# Patient Record
Sex: FEMALE | Race: WHITE | Employment: OTHER | URBAN - METROPOLITAN AREA
[De-identification: names, ages, dates, MRNs, and addresses within clinical notes are randomized per-mention and may not be internally consistent; named-entity substitution may affect disease eponyms.]

---

## 2017-03-13 ENCOUNTER — RADIANT APPOINTMENT (OUTPATIENT)
Dept: MAMMOGRAPHY | Facility: CLINIC | Age: 77
End: 2017-03-13
Payer: COMMERCIAL

## 2017-03-13 ENCOUNTER — OFFICE VISIT (OUTPATIENT)
Dept: OBGYN | Facility: CLINIC | Age: 77
End: 2017-03-13
Payer: COMMERCIAL

## 2017-03-13 VITALS
HEART RATE: 72 BPM | SYSTOLIC BLOOD PRESSURE: 124 MMHG | HEIGHT: 64 IN | BODY MASS INDEX: 33.29 KG/M2 | WEIGHT: 195 LBS | DIASTOLIC BLOOD PRESSURE: 80 MMHG

## 2017-03-13 DIAGNOSIS — Z12.31 VISIT FOR SCREENING MAMMOGRAM: ICD-10-CM

## 2017-03-13 DIAGNOSIS — Z12.73 SCREENING FOR OVARIAN CANCER: Primary | ICD-10-CM

## 2017-03-13 LAB — CANCER AG125 SERPL-ACNC: 18 U/ML (ref 0–30)

## 2017-03-13 PROCEDURE — G0145 SCR C/V CYTO,THINLAYER,RESCR: HCPCS | Performed by: OBSTETRICS & GYNECOLOGY

## 2017-03-13 PROCEDURE — 86304 IMMUNOASSAY TUMOR CA 125: CPT | Performed by: OBSTETRICS & GYNECOLOGY

## 2017-03-13 PROCEDURE — 36415 COLL VENOUS BLD VENIPUNCTURE: CPT | Performed by: OBSTETRICS & GYNECOLOGY

## 2017-03-13 PROCEDURE — G0202 SCR MAMMO BI INCL CAD: HCPCS | Mod: TC

## 2017-03-13 PROCEDURE — 99213 OFFICE O/P EST LOW 20 MIN: CPT | Performed by: OBSTETRICS & GYNECOLOGY

## 2017-03-13 RX ORDER — LATANOPROST 50 UG/ML
1 SOLUTION/ DROPS OPHTHALMIC
COMMUNITY
Start: 2016-11-14

## 2017-03-13 RX ORDER — DORZOLAMIDE HYDROCHLORIDE AND TIMOLOL MALEATE 20; 5 MG/ML; MG/ML
1 SOLUTION/ DROPS OPHTHALMIC
COMMUNITY
Start: 2016-07-19

## 2017-03-13 ASSESSMENT — PATIENT HEALTH QUESTIONNAIRE - PHQ9: 5. POOR APPETITE OR OVEREATING: NOT AT ALL

## 2017-03-13 ASSESSMENT — ANXIETY QUESTIONNAIRES
6. BECOMING EASILY ANNOYED OR IRRITABLE: NOT AT ALL
5. BEING SO RESTLESS THAT IT IS HARD TO SIT STILL: NOT AT ALL
7. FEELING AFRAID AS IF SOMETHING AWFUL MIGHT HAPPEN: NOT AT ALL
2. NOT BEING ABLE TO STOP OR CONTROL WORRYING: NOT AT ALL
1. FEELING NERVOUS, ANXIOUS, OR ON EDGE: NOT AT ALL

## 2017-03-13 NOTE — PROGRESS NOTES
Elo is a 77 year old No obstetric history on file. female who presents for annual exam.     Besides routine health maintenance, she has no other health concerns today .    Do you have a Health Care Directive?: Yes: Patient states has Advance Directive and will bring in a copy to clinic.    Fall risk:   Fallen 2 or more times in the past year?: No  Any fall with injury in the past year?: No    HPI: The patient is seen in follow-up of a 20 year history of ovarian cancer. She was aggressively debulked and treated with chemotherapy initially and has shown no evidence of recurrence in the past. Her review of systems is positive for legs aching and increased vascular compromise in both lower extremities. She denies any vaginal bleeding or pain. She does have a rectocele that is symptomatic if she gets constipated.  The patient's PCP is Mera Courtney MD.      GYNECOLOGIC HISTORY:  No LMP recorded. Patient is postmenopausal..   reports that she has never smoked. She has never used smokeless tobacco.    Patient is sexually active.  STD testing offered?  Declined  Last PHQ-9 score on record= No flowsheet data found.  Last GAD7 score on record= No flowsheet data found.  Alcohol Score = 0    HEALTH MAINTENANCE:  Cholesterol: (No results found for: CHOL   Last Mammo: 2/8/16, Result: normal, Next Mammo: today   Pap: 2/8/16 neg  DEXA:  2/8/16  Colonoscopy:  Per patient 2 years ago, Result:  normal, Next Colonoscopy: 3 years.    Health maintenance updated:  yes    HISTORY:  Obstetric History     No data available        There is no problem list on file for this patient.    Past Surgical History   Procedure Laterality Date     Hernia repair       Ablate vein varicose radio frequency without phlebectomy multiple stab  12/5/2013     Procedure: ABLATE VEIN VARICOSE RADIO FREQUENCY WITHOUT PHLEBECTOMY MULTIPLE STAB;  Right Leg Radiofrequency Ablation;  Surgeon: Chuckie Neville MD;  Location: [facility]     Hysterectomy total  abdominal, bilateral salpingo-oophorectomy, combined       omenectomy      Social History   Substance Use Topics     Smoking status: Never Smoker     Smokeless tobacco: Never Used     Alcohol use No           Current Outpatient Prescriptions   Medication Sig     clobetasol (TEMOVATE) 0.05 % ointment      dorzolamide (TRUSOPT) 2 % ophthalmic solution      TRAVATAN Z 0.004 % ophthalmic solution      BIOTIN PO Take by mouth daily     VITAMIN D, CHOLECALCIFEROL, PO Take 2,000 Units by mouth daily     levothyroxine (SYNTHROID) 100 MCG tablet Take  by mouth daily.     lisinopril (PRINIVIL,ZESTRIL) 10 MG tablet Take 10 mg by mouth daily.     triamcinolone (KENALOG) 0.1 % cream Apply  topically 2 times daily.     No current facility-administered medications for this visit.        Allergies   Allergen Reactions     Alphagan [Brimonidine Tartrate]      Augmentin      Hmg-Coa-R Inhibitors Other (See Comments)     Terbinafine Other (See Comments)     Loss of taste      Timolol Unknown     Amoxicillin Rash     Hydrochlorothiazide Rash     No Clinical Screening - See Comments Rash     eye swollwn around glue - eye drop for glaucoma       Past medical, surgical, social and family history were reviewed and updated in EPIC.    ROS:   12 point review of systems negative other than symptoms noted below.  Head: Nasal Congestion and Sore Throat  Genitourinary: Urgency and Vaginal Itching  Musculoskeletal: Muscle Cramps    EXAM:  There were no vitals taken for this visit.   BMI: There is no height or weight on file to calculate BMI.    EXAM:  Constitutional: Appearance: Well nourished, well developed alert, in no acute distress  Neck:  Lymph Nodes:  No lymphadenopathy present    Thyroid:  Gland size normal, nontender, no nodules or masses present  on palpation  Chest:  Respiratory Effort:  Breathing unlabored  Cardiovascular:Heart    Auscultation:  Regular rate, normal rhythm, no murmurs present  Breasts: Inspection of Breasts:  No  lymphadenopathy present    Palpation of Breasts and Axillae:  No masses present on palpation, no  breast tenderness    Axillary Lymph Nodes:  No lymphadenopathy present  Gastrointestinal:  Abdominal Examination:  Abdomen nontender to palpation, tone normal without     rigidity or guarding, no masses present, umbilicus without lesions    Liver and speen:  No hepatomegaly present, liver nontender to palpation    Hernias:  No hernias present  Lymphatic: Lymph Nodes:  No other lymphadenopathy present  Skin:  General Inspection:  No rashes present, no lesions present, no areas of  discoloration.    Genitalia and Groin:  No rashes present, no lesions present, no areas of  discoloration, no masses present  Neurologic/Psychiatric:    Mental Status:  Oriented X3     Pelvic Exam:  External Genitalia:     Normal appearance for age, no discharge present, no tenderness present, no inflammatory lesions present, color normal  Vagina:     Normal vaginal vault without central or paravaginal defects, no discharge present, no inflammatory lesions present, no masses present  Bladder:     Nontender to palpation  Urethra:   Urethral Body:  Urethra palpation normal, urethra structural support normal   Urethral Meatus:  No erythema or lesions present  Cervix:     Surgically absent  Uterus:     Surgically absent  Adnexa:     Surgically absent  Perineum:     Perineum within normal limits, no evidence of trauma, no rashes or skin lesions present  Anus:     Anus within normal limits, no hemorrhoids present  Inguinal Lymph Nodes:     No lymphadenopathy present    COUNSELING:   Reviewed preventive health counseling, as reflected in patient instructions       Regular exercise       Healthy diet/nutrition    BMI:  There is no height or weight on file to calculate BMI.  Weight management plan noted, stable and monitoring   reports that she has never smoked. She has never used smokeless tobacco.      ASSESSMENT:  77 year old female with satisfactory  follow-up exam for a 20 year history of ovarian cancer. She does have some increased compromise of her lower extremities by vascular insufficiency.      PLAN: We will draw a CA-125 today and refer her to vein solutions for evaluation of her lower extremities.      Glenn Rodriguez MD

## 2017-03-13 NOTE — LETTER
[facility]  [address] [place] [address] , [address]  [place]   [phone]      3/15/2017     Elo Issa   [address] [facility] [address]      Dear Elo,  We are happy to inform you that your PAP smear result is normal.  We are now able to do a follow up test on PAP smears. The DNA test is for HPV (Human Papilloma Virus). Cervical cancer is closely linked with certain types of HPV. Your result showed no evidence of HPV.  Therefore we recommend you return in 1 year for your next pap smear.  You will still need to return to the clinic every year for an annual exam and other preventive tests.  Please contact the clinic with any questions.  Sincerely,    Glenn Leger MD

## 2017-03-13 NOTE — MR AVS SNAPSHOT
"              After Visit Summary   3/13/2017    Elo Issa    MRN: [id number]           Patient Information     Date Of Birth          1940        Visit Information        Provider Department      3/13/2017 11:45 AM Glenn Rodriguez MD [facility]        Today's Diagnoses     Screening for ovarian cancer    -  1       Follow-ups after your visit        Who to contact     If you have questions or need follow up information about today's clinic visit or your schedule please contact [facility] directly at [phone].  Normal or non-critical lab and imaging results will be communicated to you by Tradiiohart, letter or phone within 4 business days after the clinic has received the results. If you do not hear from us within 7 days, please contact the clinic through Tradiiohart or phone. If you have a critical or abnormal lab result, we will notify you by phone as soon as possible.  Submit refill requests through Uromedica or call your pharmacy and they will forward the refill request to us. Please allow 3 business days for your refill to be completed.          Additional Information About Your Visit        MyChart Information     Uromedica lets you send messages to your doctor, view your test results, renew your prescriptions, schedule appointments and more. To sign up, go to [email or web address] . Click on \"Log in\" on the left side of the screen, which will take you to the Welcome page. Then click on \"Sign up Now\" on the right side of the page.     You will be asked to enter the access code listed below, as well as some personal information. Please follow the directions to create your username and password.     Your access code is: [email or web address]  Expires: 2017 12:09 PM     Your access code will  in 90 days. If you need help or a new code, please call your [place] clinic or [phone].        Care EveryWhere ID     This is your Care EveryWhere ID. This could be " "used by other organizations to access your [facility] medical records  [id number]        Your Vitals Were     Pulse Height BMI (Body Mass Index)             72 1.613 m (5' 3.5\") 34 kg/m2          Blood Pressure from Last 3 Encounters:   03/13/17 124/80   02/08/16 126/80   12/13/13 138/69    Weight from Last 3 Encounters:   03/13/17 88.5 kg (195 lb)   02/08/16 84.8 kg (187 lb)   12/13/13 90.7 kg (200 lb)              We Performed the Following          Pap imaged thin layer screen reflex to HPV if ASCUS - recommended age 25 - 29 years          Today's Medication Changes          These changes are accurate as of: 3/13/17 12:09 PM.  If you have any questions, ask your nurse or doctor.               Stop taking these medicines if you haven't already. Please contact your care team if you have questions.     clobetasol 0.05 % ointment   Commonly known as:  TEMOVATE   Stopped by:  Glenn Rodriguez MD           dorzolamide 2 % ophthalmic solution   Commonly known as:  TRUSOPT   Stopped by:  lGenn Rodriguez MD           TRAVATAN Z 0.004 % ophthalmic solution   Generic drug:  travoprost (SHAQUILLE Free)   Stopped by:  Glenn Rodriguez MD                    Primary Care Provider Office Phone # Fax #    Kenyatta Courtney [phone]       [place] [address]        Thank you!     Thank you for choosing [facility] [place]  for your care. Our goal is always to provide you with excellent care. Hearing back from our patients is one way we can continue to improve our services. Please take a few minutes to complete the written survey that you may receive in the mail after your visit with us. Thank you!             Your Updated Medication List - Protect others around you: Learn how to safely use, store and throw away your medicines at www.disposemymeds.org.          This list is accurate as of: 3/13/17 12:09 PM.  Always use your most recent med list.          "          Brand Name Dispense Instructions for use    BIOTIN PO      Take by mouth daily       dorzolamide-timolol 2-0.5 % ophthalmic solution    COSOPT     1 drop       latanoprost 0.005 % ophthalmic solution    XALATAN     1 drop       lisinopril 10 MG tablet    PRINIVIL/ZESTRIL     Take 10 mg by mouth daily.       SYNTHROID 100 MCG tablet   Generic drug:  levothyroxine      Take  by mouth daily.       triamcinolone 0.1 % cream    KENALOG     Apply  topically 2 times daily.       VITAMIN D (CHOLECALCIFEROL) PO      Take 2,000 Units by mouth daily

## 2017-03-14 ASSESSMENT — PATIENT HEALTH QUESTIONNAIRE - PHQ9: SUM OF ALL RESPONSES TO PHQ QUESTIONS 1-9: 0

## 2017-03-15 ENCOUNTER — TELEPHONE (OUTPATIENT)
Dept: OBGYN | Facility: CLINIC | Age: 77
End: 2017-03-15

## 2017-03-15 LAB
COPATH REPORT: NORMAL
PAP: NORMAL

## 2018-03-27 ENCOUNTER — RADIANT APPOINTMENT (OUTPATIENT)
Dept: MAMMOGRAPHY | Facility: CLINIC | Age: 78
End: 2018-03-27
Attending: OBSTETRICS & GYNECOLOGY
Payer: COMMERCIAL

## 2018-03-27 ENCOUNTER — OFFICE VISIT (OUTPATIENT)
Dept: OBGYN | Facility: CLINIC | Age: 78
End: 2018-03-27
Attending: OBSTETRICS & GYNECOLOGY
Payer: COMMERCIAL

## 2018-03-27 VITALS
SYSTOLIC BLOOD PRESSURE: 126 MMHG | DIASTOLIC BLOOD PRESSURE: 70 MMHG | HEIGHT: 63 IN | BODY MASS INDEX: 34.02 KG/M2 | HEART RATE: 72 BPM | WEIGHT: 192 LBS

## 2018-03-27 DIAGNOSIS — Z12.31 VISIT FOR SCREENING MAMMOGRAM: ICD-10-CM

## 2018-03-27 DIAGNOSIS — Z12.4 SCREENING FOR CERVICAL CANCER: Primary | ICD-10-CM

## 2018-03-27 DIAGNOSIS — Z85.43 PERSONAL HISTORY OF OVARIAN CANCER: ICD-10-CM

## 2018-03-27 LAB — CANCER AG125 SERPL-ACNC: 15 U/ML (ref 0–30)

## 2018-03-27 PROCEDURE — G0145 SCR C/V CYTO,THINLAYER,RESCR: HCPCS | Performed by: OBSTETRICS & GYNECOLOGY

## 2018-03-27 PROCEDURE — G0476 HPV COMBO ASSAY CA SCREEN: HCPCS | Performed by: OBSTETRICS & GYNECOLOGY

## 2018-03-27 PROCEDURE — 77067 SCR MAMMO BI INCL CAD: CPT | Mod: TC

## 2018-03-27 PROCEDURE — 86304 IMMUNOASSAY TUMOR CA 125: CPT | Performed by: OBSTETRICS & GYNECOLOGY

## 2018-03-27 PROCEDURE — 99213 OFFICE O/P EST LOW 20 MIN: CPT | Performed by: OBSTETRICS & GYNECOLOGY

## 2018-03-27 PROCEDURE — 36415 COLL VENOUS BLD VENIPUNCTURE: CPT | Performed by: OBSTETRICS & GYNECOLOGY

## 2018-03-27 RX ORDER — BRIMONIDINE TARTRATE 1.5 MG/ML
SOLUTION/ DROPS OPHTHALMIC
COMMUNITY
Start: 2018-01-18

## 2018-03-27 ASSESSMENT — ANXIETY QUESTIONNAIRES
IF YOU CHECKED OFF ANY PROBLEMS ON THIS QUESTIONNAIRE, HOW DIFFICULT HAVE THESE PROBLEMS MADE IT FOR YOU TO DO YOUR WORK, TAKE CARE OF THINGS AT HOME, OR GET ALONG WITH OTHER PEOPLE: NOT DIFFICULT AT ALL
6. BECOMING EASILY ANNOYED OR IRRITABLE: NOT AT ALL
2. NOT BEING ABLE TO STOP OR CONTROL WORRYING: NOT AT ALL
7. FEELING AFRAID AS IF SOMETHING AWFUL MIGHT HAPPEN: NOT AT ALL
5. BEING SO RESTLESS THAT IT IS HARD TO SIT STILL: NOT AT ALL
1. FEELING NERVOUS, ANXIOUS, OR ON EDGE: NOT AT ALL
GAD7 TOTAL SCORE: 0
3. WORRYING TOO MUCH ABOUT DIFFERENT THINGS: NOT AT ALL

## 2018-03-27 ASSESSMENT — PATIENT HEALTH QUESTIONNAIRE - PHQ9: 5. POOR APPETITE OR OVEREATING: NOT AT ALL

## 2018-03-27 NOTE — PROGRESS NOTES
Elo is a 78 year old No obstetric history on file. female who presents for ongoing evaluation and follow-up of a long-standing diagnosis of ovarian cancer.    Besides routine health maintenance, she has no other health concerns today .    Do you have a Health Care Directive?: Yes: Patient states has Advance Directive and will bring in a copy to clinic.    Fall risk:   Fall Risk Assessment completed per order.    HPI: The patient is seen at this time for follow-up of stage III ovarian cancer diagnosed and treated in 1997.  She has had a recent colonoscopy with 2 biopsies were negative.  She has no GI complaints at this time.  The patient's PCP is Kenyatta Courtney MD, MD.      GYNECOLOGIC HISTORY:  No LMP recorded. Patient is postmenopausal..   reports that she has never smoked. She has never used smokeless tobacco.    Patient is sexually active.  STD testing offered?  Declined  Last PHQ-9 score on record=   PHQ-9 SCORE 3/27/2018   Total Score 0     Last GAD7 score on record=   ROBERT-7 SCORE 3/27/2018   Total Score 0     Alcohol Score = 0    HEALTH MAINTENANCE:  Cholesterol: None found.  Last Mammo: one year ago, Result: normal, Next Mammo: today   Pap:   Lab Results   Component Value Date    PAP NIL 03/13/2017    PAP NIL 02/08/2016      DEXA:  02/08/16  Colonoscopy:  03/12/18, Result:  [facility], Next Colonoscopy: 3 years.    Health maintenance updated:  yes    HISTORY:  Obstetric History     No data available        There is no problem list on file for this patient.    Past Surgical History:   Procedure Laterality Date     ABLATE VEIN VARICOSE RADIO FREQUENCY WITHOUT PHLEBECTOMY MULTIPLE STAB  12/5/2013    Procedure: ABLATE VEIN VARICOSE RADIO FREQUENCY WITHOUT PHLEBECTOMY MULTIPLE STAB;  Right Leg Radiofrequency Ablation;  Surgeon: Chuckie Neville MD;  Location: [facility] OR     HERNIA REPAIR       HYSTERECTOMY TOTAL ABDOMINAL, BILATERAL SALPINGO-OOPHORECTOMY, COMBINED      omenectomy     "  Social History   Substance Use Topics     Smoking status: Never Smoker     Smokeless tobacco: Never Used     Alcohol use No           Current Outpatient Prescriptions   Medication Sig     brimonidine (ALPHAGAN-P) 0.15 % ophthalmic solution PLACE 1 DROP INTO RIGHT EYE 3 TIMES DAILY.     latanoprost (XALATAN) 0.005 % ophthalmic solution 1 drop     dorzolamide-timolol (COSOPT) 2-0.5 % ophthalmic solution 1 drop     BIOTIN PO Take by mouth daily     VITAMIN D, CHOLECALCIFEROL, PO Take 2,000 Units by mouth daily     levothyroxine (SYNTHROID) 100 MCG tablet Take  by mouth daily.     lisinopril (PRINIVIL,ZESTRIL) 10 MG tablet Take 10 mg by mouth daily.     triamcinolone (KENALOG) 0.1 % cream Apply  topically 2 times daily.     No current facility-administered medications for this visit.        Allergies   Allergen Reactions     Alphagan [Brimonidine Tartrate]      Augmentin      Cefuroxime Hives     Hmg-Coa-R Inhibitors Other (See Comments)     Terbinafine Other (See Comments)     Loss of taste      Timolol Unknown     Amoxicillin Rash     Brimonidine      Other reaction(s): Intolerance-Can't Take     Hydrochlorothiazide Rash     No Clinical Screening - See Comments Rash     eye swollwn around glue - eye drop for glaucoma     Triple Antibiotic Rash       Past medical, surgical, social and family history were reviewed and updated in EPIC.    ROS:   12 point review of systems negative other than symptoms noted below.  Gastrointestinal: Diarrhea  Skin: Rash    EXAM:  /70  Pulse 72  Ht 5' 3.25\" (1.607 m)  Wt 192 lb (87.1 kg)  BMI 33.74 kg/m2   BMI: Body mass index is 33.74 kg/(m^2).    EXAM:  Constitutional: Appearance: Well nourished, well developed alert, in no acute distress  Neck:  Lymph Nodes:  No lymphadenopathy present    Thyroid:  Gland size normal, nontender, no nodules or masses present  on palpation  Chest:  Respiratory Effort:  Breathing unlabored  Cardiovascular:Heart    Auscultation:  Regular rate, " normal rhythm, no murmurs present  Breasts: Inspection of Breasts:  No lymphadenopathy present., Palpation of Breasts and Axillae:  No masses present on palpation, no breast tenderness., Axillary Lymph Nodes:  No lymphadenopathy present. and No nodularity, asymmetry or nipple discharge bilaterally.  Gastrointestinal:  Abdominal Examination:  Abdomen nontender to palpation, tone normal without     rigidity or guarding, no masses present, umbilicus without lesions    Liver and speen:  No hepatomegaly present, liver nontender to palpation    Hernias:  No hernias present  Lymphatic: Lymph Nodes:  No other lymphadenopathy present  Skin:  General Inspection:  No rashes present, no lesions present, no areas of  discoloration.    Genitalia and Groin:  No rashes present, no lesions present, no areas of  discoloration, no masses present  Neurologic/Psychiatric:    Mental Status:  Oriented X3     Pelvic Exam:  External Genitalia:     Normal appearance for age, no discharge present, no tenderness present, no inflammatory lesions present, color normal  Vagina:     Normal vaginal vault without central or paravaginal defects, no discharge present, no inflammatory lesions present, no masses present  Bladder:     Nontender to palpation  Urethra:   Urethral Body:  Urethra palpation normal, urethra structural support normal   Urethral Meatus:  No erythema or lesions present  Cervix:     Surgically absent  Uterus:     Surgically absent  Adnexa:     Surgically absent  Perineum:     Perineum within normal limits, no evidence of trauma, no rashes or skin lesions present  Anus:     Anus within normal limits, no hemorrhoids present  Inguinal Lymph Nodes:     No lymphadenopathy present    COUNSELING:   Reviewed preventive health counseling, as reflected in patient instructions    BMI:  Body mass index is 33.74 kg/(m^2).  Weight management plan noted, stable and monitoring   reports that she has never smoked. She has never used smokeless  tobacco.      ASSESSMENT:  78 year old female with satisfactory ovarian cancer follow-up check.    PLAN: We will convey the patient's , Pap smear, and mammogram results when available.      Glenn Rodriguez MD

## 2018-03-27 NOTE — MR AVS SNAPSHOT
"              After Visit Summary   3/27/2018    Elo Issa    MRN: [id number]           Patient Information     Date Of Birth          1940        Visit Information        Provider Department      3/27/2018 1:00 PM Glenn Rodriguez MD [facility] [place]        Today's Diagnoses     Screening for cervical cancer    -  1    Personal history of ovarian cancer           Follow-ups after your visit        Your next 10 appointments already scheduled     Mar 27, 2018  1:30 PM CDT   (Arrive by 1:15 PM)   MA SCREENING DIGITAL BILATERAL with WEMA1   [facility] [place] ([facility] [place])    [address], [address]  [place] [address] [phone]           Do not use any powder, lotion or deodorant under your arms or on your breast. If you do, we will ask you to remove it before your exam.  Wear comfortable, two-piece clothing.  If you have any allergies, tell your care team.  Bring any previous mammograms from other facilities or have them mailed to the breast center. Three-dimensional (3D) mammograms are available at [place] locations in [facility], [facility], [place], [place], and [place]. [facility] locations include [place] [facility] in [place]. Benefits of 3D mammograms include: - Improved rate of cancer detection - Decreases your chance of having to go back for more tests, which means fewer: - \"False-positive\" results (This means that there is an abnormal area but it isn't cancer.) - Invasive testing procedures, such as a biopsy or surgery - Can provide clearer images of the breast if you have dense breast tissue. 3D mammography is an optional exam that anyone can have with a 2D mammogram. It doesn't replace or take the place of a 2D mammogram. 2D mammograms remain an effective screening test for all women.  Not all insurance companies cover the cost of a 3D mammogram. Check with your " "insurance.              Who to contact     If you have questions or need follow up information about today's clinic visit or your schedule please contact [facility] directly at [phone].  Normal or non-critical lab and imaging results will be communicated to you by MyChart, letter or phone within 4 business days after the clinic has received the results. If you do not hear from us within 7 days, please contact the clinic through MyChart or phone. If you have a critical or abnormal lab result, we will notify you by phone as soon as possible.  Submit refill requests through BotScanner or call your pharmacy and they will forward the refill request to us. Please allow 3 business days for your refill to be completed.          Additional Information About Your Visit        BarreharBangee Information     BotScanner lets you send messages to your doctor, view your test results, renew your prescriptions, schedule appointments and more. To sign up, go to [email or web address] . Click on \"Log in\" on the left side of the screen, which will take you to the Welcome page. Then click on \"Sign up Now\" on the right side of the page.     You will be asked to enter the access code listed below, as well as some personal information. Please follow the directions to create your username and password.     Your access code is: [email or web address]  Expires: 2018  1:23 PM     Your access code will  in 90 days. If you need help or a new code, please call your [facility] clinic or [phone].        Care EveryWhere ID     This is your Care EveryWhere ID. This could be used by other organizations to access your [facility] medical records  [id number]        Your Vitals Were     Pulse Height BMI (Body Mass Index)             72 5' 3.25\" (1.607 m) 33.74 kg/m2          Blood Pressure from Last 3 Encounters:   18 126/70   17 124/80   16 126/80    Weight from Last 3 Encounters:   18 192 lb (87.1 kg)   17 " 195 lb (88.5 kg)   02/08/16 187 lb (84.8 kg)              We Performed the Following     HPV High Risk Types DNA Cervical     Pap imaged thin layer screen with HPV - recommended age 30 - 65        Primary Care Provider Office Phone # Fax #    Kenyatta Courtney [phone]       [facility] [address] [place] [address]        Equal Access to Services     MANDI GARCIA : Hadii aad ku hadasho Soomaali, waaxda luqadaha, qaybta kaalmada adeegyada, waxay idiin hayaan adeeg dandresh laAngelitajuven . So [facility] [phone].    ATENCIÓN: Si juliet harris, tiene a martinez disposición servicios gratuitos de asistencia lingüística. [facility] [phone].    We comply with applicable federal civil rights laws and [place] laws. We do not discriminate on the basis of race, color, national origin, age, disability, sex, sexual orientation, or gender identity.            Thank you!     Thank you for choosing [facility] [place]  for your care. Our goal is always to provide you with excellent care. Hearing back from our patients is one way we can continue to improve our services. Please take a few minutes to complete the written survey that you may receive in the mail after your visit with us. Thank you!             Your Updated Medication List - Protect others around you: Learn how to safely use, store and throw away your medicines at www.disposemymeds.org.          This list is accurate as of 3/27/18  1:23 PM.  Always use your most recent med list.                   Brand Name Dispense Instructions for use Diagnosis    BIOTIN PO      Take by mouth daily        brimonidine 0.15 % ophthalmic solution    ALPHAGAN-P     PLACE 1 DROP INTO RIGHT EYE 3 TIMES DAILY.        dorzolamide-timolol 2-0.5 % ophthalmic solution    COSOPT     1 drop        latanoprost 0.005 % ophthalmic solution    XALATAN     1 drop        lisinopril 10 MG tablet    PRINIVIL/ZESTRIL     Take 10 mg by mouth daily.        SYNTHROID  100 MCG tablet   Generic drug:  levothyroxine      Take  by mouth daily.        triamcinolone 0.1 % cream    KENALOG     Apply  topically 2 times daily.        VITAMIN D (CHOLECALCIFEROL) PO      Take 2,000 Units by mouth daily

## 2018-03-27 NOTE — LETTER
April 6, 2018    Elo Issa  [address] [facility] [address]    Dear Elo,  We are happy to inform you that your PAP smear result from 3/27/18 is normal.  We are now able to do a follow up test on PAP smears. The DNA test is for HPV (Human Papilloma Virus). Cervical cancer is closely linked with certain types of HPV. Your results showed no evidence of high risk HPV.  Therefore we recommend you return in 1 year for your next vaginal pap smear.  You will still need to return to the clinic every year for an annual exam and other preventive tests.  Please contact the clinic at [phone] with any questions.  Sincerely,    Glenn Rodriguez MD/anjana

## 2018-03-28 ASSESSMENT — PATIENT HEALTH QUESTIONNAIRE - PHQ9: SUM OF ALL RESPONSES TO PHQ QUESTIONS 1-9: 0

## 2018-03-28 ASSESSMENT — ANXIETY QUESTIONNAIRES: GAD7 TOTAL SCORE: 0

## 2018-03-29 LAB
COPATH REPORT: NORMAL
PAP: NORMAL

## 2018-04-02 LAB
FINAL DIAGNOSIS: NORMAL
HPV HR 12 DNA CVX QL NAA+PROBE: NEGATIVE
HPV16 DNA SPEC QL NAA+PROBE: NEGATIVE
HPV18 DNA SPEC QL NAA+PROBE: NEGATIVE
SPECIMEN DESCRIPTION: NORMAL
SPECIMEN SOURCE CVX/VAG CYTO: NORMAL

## 2018-07-05 ENCOUNTER — OFFICE VISIT (OUTPATIENT)
Dept: OBGYN | Facility: CLINIC | Age: 78
End: 2018-07-05
Payer: COMMERCIAL

## 2018-07-05 VITALS — SYSTOLIC BLOOD PRESSURE: 122 MMHG | BODY MASS INDEX: 33.74 KG/M2 | DIASTOLIC BLOOD PRESSURE: 76 MMHG | WEIGHT: 192 LBS

## 2018-07-05 DIAGNOSIS — N81.6 RECTOCELE: ICD-10-CM

## 2018-07-05 DIAGNOSIS — N95.2 VAGINAL ATROPHY: ICD-10-CM

## 2018-07-05 DIAGNOSIS — R30.0 DYSURIA: Primary | ICD-10-CM

## 2018-07-05 DIAGNOSIS — R82.90 NONSPECIFIC FINDING ON EXAMINATION OF URINE: ICD-10-CM

## 2018-07-05 LAB
BACTERIA #/AREA URNS HPF: ABNORMAL /HPF
NON-SQ EPI CELLS #/AREA URNS LPF: ABNORMAL /LPF
RBC #/AREA URNS AUTO: ABNORMAL /HPF
WBC #/AREA URNS AUTO: ABNORMAL /HPF

## 2018-07-05 PROCEDURE — 81015 MICROSCOPIC EXAM OF URINE: CPT | Performed by: OBSTETRICS & GYNECOLOGY

## 2018-07-05 PROCEDURE — 87086 URINE CULTURE/COLONY COUNT: CPT | Performed by: OBSTETRICS & GYNECOLOGY

## 2018-07-05 PROCEDURE — 87088 URINE BACTERIA CULTURE: CPT | Performed by: OBSTETRICS & GYNECOLOGY

## 2018-07-05 PROCEDURE — 99213 OFFICE O/P EST LOW 20 MIN: CPT | Performed by: OBSTETRICS & GYNECOLOGY

## 2018-07-05 RX ORDER — ESTRADIOL 0.1 MG/G
1 CREAM VAGINAL AT BEDTIME
Qty: 42.5 G | Refills: 6 | Status: SHIPPED | OUTPATIENT
Start: 2018-07-05 | End: 2019-04-03

## 2018-07-05 NOTE — PROGRESS NOTES
SUBJECTIVE:                                                   Elo Issa is a 78 year old female who presents to clinic today for the following health issue(s):  Patient presents with:  Bladder Problems      HPI: The patient is seen at this time for follow-up of a recent bladder infection.  She was treated with Macrodantin and her symptoms have cleared.  Her second issue is pressure and bulge of the posterior vaginal wall.  She has been picking up her grandchildren and has felt difficulty emptying her bowel or defecation.  She does have to splint.      No LMP recorded. Patient is postmenopausal..   Patient is not sexually active, No obstetric history on file..  Using menopause for contraception.    reports that she has never smoked. She has never used smokeless tobacco.    STD testing offered?  Declined    Health maintenance updated:  yes    Today's PHQ-2 Score:   PHQ-2 ( 1999 [employer]) 2/8/2016   Q1: Little interest or pleasure in doing things 0   Q2: Feeling down, depressed or hopeless 0   PHQ-2 Score 0     Today's PHQ-9 Score:   PHQ-9 SCORE 3/27/2018   Total Score 0     Today's ROBERT-7 Score:   ROBERT-7 SCORE 3/27/2018   Total Score 0       Problem list and histories reviewed & adjusted, as indicated.  Additional history: as documented.    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 PHLEBECTOMY MULTIPLE STAB  12/5/2013    Procedure: ABLATE VEIN VARICOSE RADIO FREQUENCY WITHOUT PHLEBECTOMY MULTIPLE STAB;  Right Leg Radiofrequency Ablation;  Surgeon: Chuckie Neville MD;  Location: [facility]     HERNIA REPAIR       HYSTERECTOMY TOTAL ABDOMINAL, BILATERAL SALPINGO-OOPHORECTOMY, COMBINED      omenectomy      Social History   Substance Use Topics     Smoking status: Never Smoker     Smokeless tobacco: Never Used     Alcohol use No           Current Outpatient  Prescriptions   Medication Sig     BIOTIN PO Take by mouth daily     brimonidine (ALPHAGAN-P) 0.15 % ophthalmic solution PLACE 1 DROP INTO RIGHT EYE 3 TIMES DAILY.     dorzolamide-timolol (COSOPT) 2-0.5 % ophthalmic solution 1 drop     latanoprost (XALATAN) 0.005 % ophthalmic solution 1 drop     levothyroxine (SYNTHROID) 100 MCG tablet Take  by mouth daily.     lisinopril (PRINIVIL,ZESTRIL) 10 MG tablet Take 10 mg by mouth daily.     triamcinolone (KENALOG) 0.1 % cream Apply  topically 2 times daily.     VITAMIN D, CHOLECALCIFEROL, PO Take 2,000 Units by mouth daily     No current facility-administered medications for this visit.      Allergies   Allergen Reactions     Alphagan [Brimonidine Tartrate]      Augmentin      Cefuroxime Hives     Hmg-Coa-R Inhibitors Other (See Comments)     Terbinafine Other (See Comments)     Loss of taste      Timolol Unknown     Amoxicillin Rash     Brimonidine      Other reaction(s): Intolerance-Can't Take     Hydrochlorothiazide Rash     No Clinical Screening - See Comments Rash     eye swollwn around glue - eye drop for glaucoma     Triple Antibiotic Rash       ROS:  12 point review of systems negative other than symptoms noted below.  Gastrointestinal: Abdominal Pain, Constipation and Diarrhea  Genitourinary: Painful Urination, Urgency and Vaginal Itching    OBJECTIVE:     /76  Wt 192 lb (87.1 kg)  Breastfeeding? No  BMI 33.74 kg/m2  Body mass index is 33.74 kg/(m^2).    Exam:  Constitutional:  Appearance: Well nourished, well developed alert, in no acute distress  Gastrointestinal:  Abdominal Examination:  Abdomen nontender to palpation, tone normal without rigidity or guarding, no masses present, umbilicus without lesions; Liver/Spleen:  No hepatomegaly present, liver nontender to palpation; Hernias:  No hernias present  Lymphatic: Lymph Nodes:  No other lymphadenopathy present  Skin:General Inspection:  No rashes present, no lesions present, no areas of discoloration;  Genitalia and Groin:  No rashes present, no lesions present, no areas of discoloration, no masses present.  Neurologic/Psychiatric:  Mental Status:  Oriented X3   Pelvic Exam:  External Genitalia:     Normal appearance for age, no discharge present, no tenderness present, no inflammatory lesions present, color normal  Vagina: Atrophic external genitalia.  There is a prominent rectocele bulging at this time.  There is actually fairly decent apical and anterior support.    Bladder:     Nontender to palpation  Urethra:   Urethral Body:  Urethra palpation normal, urethra structural support normal   Urethral Meatus:  No erythema or lesions present  Cervix:     Surgically absent  Uterus:     Surgically absent  Adnexa:     Surgically absent  Perineum:     Perineum within normal limits, no evidence of trauma, no rashes or skin lesions present  Anus:     Anus within normal limits, no hemorrhoids present  Inguinal Lymph Nodes:     No lymphadenopathy present     In-Clinic Test Results:  Results for orders placed or performed in visit on 07/05/18 (from the past 24 hour(s))   Urine Microscopic   Result Value Ref Range    WBC Urine 0 - 5 OTO5^0 - 5 /HPF    RBC Urine O - 2 OTO2^O - 2 /HPF    Squamous Epithelial /LPF Urine Few FEW^Few /LPF    Bacteria Urine Few (A) NEG^Negative /HPF       ASSESSMENT/PLAN:                                                        ICD-10-CM    1. Dysuria R30.0 Urine Microscopic     CANCELED: UA with Microscopic     We will follow-up the culture for Elo.  We discussed and showed a diagram of the issue of her rectocele.  She has so much atrophy that some estrogenization of the vagina would improve her rectocele repair that we could get to this fall.  We have asked her to avoid any further lifting and straining with bowel movements.  She should use a stool softener and avoid any foods that constipate her.  She will return in 4 weeks.        Glenn Rodriguez MD  [facility] [place]

## 2018-07-05 NOTE — MR AVS SNAPSHOT
After Visit Summary   7/5/2018    Elo Issa    MRN: [id number]           Patient Information     Date Of Birth          1940        Visit Information        Provider Department      7/5/2018 3:00 PM Glenn Rodriguez MD [facility]        Today's Diagnoses     Dysuria    -  1    Vaginal atrophy        Rectocele           Follow-ups after your visit        Who to contact     If you have questions or need follow up information about today's clinic visit or your schedule please contact [facility] directly at [phone].  Normal or non-critical lab and imaging results will be communicated to you by MyChart, letter or phone within 4 business days after the clinic has received the results. If you do not hear from us within 7 days, please contact the clinic through MyChart or phone. If you have a critical or abnormal lab result, we will notify you by phone as soon as possible.  Submit refill requests through [employer] or call your pharmacy and they will forward the refill request to us. Please allow 3 business days for your refill to be completed.          Additional Information About Your Visit        Care EveryWhere ID     This is your Care EveryWhere ID. This could be used by other organizations to access your [place] medical records  [id number]        Your Vitals Were     Breastfeeding? BMI (Body Mass Index)                No 33.74 kg/m2           Blood Pressure from Last 3 Encounters:   07/05/18 122/76   03/27/18 126/70   03/13/17 124/80    Weight from Last 3 Encounters:   07/05/18 192 lb (87.1 kg)   03/27/18 192 lb (87.1 kg)   03/13/17 195 lb (88.5 kg)              We Performed the Following     Urine Microscopic          Today's Medication Changes          These changes are accurate as of 7/5/18  3:23 PM.  If you have any questions, ask your nurse or doctor.               Start taking these medicines.        Dose/Directions    estradiol 0.1 MG/GM  cream   Commonly known as:  ESTRACE VAGINAL   Used for:  Vaginal atrophy   Started by:  Glenn Rodriguez MD        Dose:  1 g   Place 1 g vaginally At Bedtime   Quantity:  42.5 g   Refills:  6            Where to get your medicines      These medications were sent to [facility] [id number] [facility] - [address] [address]    Hours:  M-F 9-7 SAT 9-6 SUN 11-3 Phone:  [phone]     estradiol 0.1 MG/GM cream                Primary Care Provider Office Phone # Fax #    Kenyatta Courtney [phone]       [facility] [address] [facility] [address]        Equal Access to Services     MANDI GARCIA : Dharmesh Louis, waartemio murillo, qaybta kaalmada duarte, tyler bauer. So [facility] [phone].    ATENCIÓN: Si habla español, tiene a martinez disposición servicios gratuitos de asistencia lingüística. Ll[facility] [phone].    We comply with applicable federal civil rights laws and [place] laws. We do not discriminate on the basis of race, color, national origin, age, disability, sex, sexual orientation, or gender identity.            Thank you!     Thank you for choosing [facility] [place]  for your care. Our goal is always to provide you with excellent care. Hearing back from our patients is one way we can continue to improve our services. Please take a few minutes to complete the written survey that you may receive in the mail after your visit with us. Thank you!             Your Updated Medication List - Protect others around you: Learn how to safely use, store and throw away your medicines at www.disposemymeds.org.          This list is accurate as of 7/5/18  3:23 PM.  Always use your most recent med list.                   Brand Name Dispense Instructions for use Diagnosis    BIOTIN PO      Take by mouth daily        brimonidine 0.15 % ophthalmic solution    ALPHAGAN-P     PLACE  1 DROP INTO RIGHT EYE 3 TIMES DAILY.        dorzolamide-timolol 2-0.5 % ophthalmic solution    COSOPT     1 drop        estradiol 0.1 MG/GM cream    ESTRACE VAGINAL    42.5 g    Place 1 g vaginally At Bedtime    Vaginal atrophy       latanoprost 0.005 % ophthalmic solution    XALATAN     1 drop        lisinopril 10 MG tablet    PRINIVIL/ZESTRIL     Take 10 mg by mouth daily.        SYNTHROID 100 MCG tablet   Generic drug:  levothyroxine      Take  by mouth daily.        triamcinolone 0.1 % cream    KENALOG     Apply  topically 2 times daily.        VITAMIN D (CHOLECALCIFEROL) PO      Take 2,000 Units by mouth daily

## 2018-07-07 LAB
BACTERIA SPEC CULT: ABNORMAL
SPECIMEN SOURCE: ABNORMAL

## 2018-08-02 ENCOUNTER — OFFICE VISIT (OUTPATIENT)
Dept: OBGYN | Facility: CLINIC | Age: 78
End: 2018-08-02
Payer: COMMERCIAL

## 2018-08-02 VITALS
HEIGHT: 63 IN | BODY MASS INDEX: 34.2 KG/M2 | DIASTOLIC BLOOD PRESSURE: 80 MMHG | WEIGHT: 193 LBS | SYSTOLIC BLOOD PRESSURE: 122 MMHG

## 2018-08-02 DIAGNOSIS — N81.6 RECTOCELE: Primary | ICD-10-CM

## 2018-08-02 PROCEDURE — 99213 OFFICE O/P EST LOW 20 MIN: CPT | Performed by: OBSTETRICS & GYNECOLOGY

## 2018-08-02 NOTE — PROGRESS NOTES
SUBJECTIVE:                                                   Elo Issa is a 78 year old female who presents to clinic today for the following health issue(s):  Patient presents with:  Follow Up For: patient was seen recently for rectocele and bladder issues. patient states vaginal cream has been helping with the itching. she feels better with intake of stool softner and has not been lifting as much      HPI: The patient is seen at this time for follow-up of vaginal pressure from a rectocele and irritation from atrophy.  She has diligently used some estrogen cream with good relief.  She is doing her Kegel exercises.  She is a long-term survivor of ovarian cancer with no current problems.      No LMP recorded. Patient has had a hysterectomy..   Patient is sexually active, No obstetric history on file..  Using menopause and hysterectomy for contraception.    reports that she has never smoked. She has never used smokeless tobacco.    STD testing offered?  Declined    Health maintenance updated:  yes    Today's PHQ-2 Score:   PHQ-2 ( 1999 Pfizer) 2/8/2016   Q1: Little interest or pleasure in doing things 0   Q2: Feeling down, depressed or hopeless 0   PHQ-2 Score 0     Today's PHQ-9 Score:   PHQ-9 SCORE 3/27/2018   Total Score 0     Today's ROBERT-7 Score:   ROBERT-7 SCORE 3/27/2018   Total Score 0       Problem list and histories reviewed & adjusted, as indicated.  Additional history: as documented.    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 PHLEBECTOMY MULTIPLE STAB  12/5/2013    Procedure: ABLATE VEIN VARICOSE RADIO FREQUENCY WITHOUT PHLEBECTOMY MULTIPLE STAB;  Right Leg Radiofrequency Ablation;  Surgeon: Chuckie Neville MD;  Location: [facility] OR     HERNIA REPAIR       HYSTERECTOMY TOTAL ABDOMINAL, BILATERAL SALPINGO-OOPHORECTOMY, COMBINED      omenectomy     "  Social History   Substance Use Topics     Smoking status: Never Smoker     Smokeless tobacco: Never Used     Alcohol use No           Current Outpatient Prescriptions   Medication Sig     BIOTIN PO Take by mouth daily     brimonidine (ALPHAGAN-P) 0.15 % ophthalmic solution PLACE 1 DROP INTO RIGHT EYE 3 TIMES DAILY.     dorzolamide-timolol (COSOPT) 2-0.5 % ophthalmic solution 1 drop     estradiol (ESTRACE VAGINAL) 0.1 MG/GM cream Place 1 g vaginally At Bedtime     latanoprost (XALATAN) 0.005 % ophthalmic solution 1 drop     levothyroxine (SYNTHROID) 100 MCG tablet Take  by mouth daily.     lisinopril (PRINIVIL,ZESTRIL) 10 MG tablet Take 10 mg by mouth daily.     triamcinolone (KENALOG) 0.1 % cream Apply  topically 2 times daily.     VITAMIN D, CHOLECALCIFEROL, PO Take 2,000 Units by mouth daily     No current facility-administered medications for this visit.      Allergies   Allergen Reactions     Alphagan [Brimonidine Tartrate]      Augmentin      Cefuroxime Hives     Hmg-Coa-R Inhibitors Other (See Comments)     Terbinafine Other (See Comments)     Loss of taste      Timolol Unknown     Amoxicillin Rash     Brimonidine      Other reaction(s): Intolerance-Can't Take     Hydrochlorothiazide Rash     No Clinical Screening - See Comments Rash     eye swollwn around glue - eye drop for glaucoma     Triple Antibiotic Rash       ROS:  12 point review of systems negative other than symptoms noted below.    OBJECTIVE:     /80  Ht 5' 3.25\" (1.607 m)  Wt 193 lb (87.5 kg)  BMI 33.92 kg/m2  Body mass index is 33.92 kg/(m^2).    Exam:  Constitutional:  Appearance: Well nourished, well developed alert, in no acute distress  Neck:  Lymph Nodes:  No lymphadenopathy present; Thyroid:  Gland size normal, nontender, no nodules or masses present on palpation  Gastrointestinal:  Abdominal Examination:  Abdomen nontender to palpation, tone normal without rigidity or guarding, no masses present, umbilicus without lesions; " Liver/Spleen:  No hepatomegaly present, liver nontender to palpation; Hernias:  No hernias present  Lymphatic: Lymph Nodes:  No other lymphadenopathy present  Skin:General Inspection:  No rashes present, no lesions present, no areas of discoloration; Genitalia and Groin:  No rashes present, no lesions present, no areas of discoloration, no masses present.  Neurologic/Psychiatric:  Mental Status:  Oriented X3   Pelvic Exam:  External Genitalia:     Normal appearance for age, no discharge present, no tenderness present, no inflammatory lesions present, color normal  Vagina:     Normal vaginal vault without central or paravaginal defects, no discharge present, no inflammatory lesions present, no masses present moderate rectocele present  Bladder:     Nontender to palpation  Urethra:   Urethral Body:  Urethra palpation normal, urethra structural support normal   Urethral Meatus:  No erythema or lesions present  Cervix:     Surgically absent  Uterus:     Surgically absent  Adnexa:     Surgically absent  Perineum:     Perineum within normal limits, no evidence of trauma, no rashes or skin lesions present  Anus:     Anus within normal limits, no hemorrhoids present  Inguinal Lymph Nodes:     No lymphadenopathy present     In-Clinic Test Results:      ASSESSMENT/PLAN:                                                        The patient has no change in her rectocele.  It is not debilitating enough to worry about surgical correction at this time.  She has improved vaginal health.  We have asked her to use a and D ointment on her external irritation.  She will return for her ovarian cancer checkup in March      Glenn Rodriguez MD  [facility] [place]

## 2018-08-02 NOTE — MR AVS SNAPSHOT
"              After Visit Summary   8/2/2018    Elo Issa    MRN: [id number]           Patient Information     Date Of Birth          1940        Visit Information        Provider Department      8/2/2018 1:45 PM Glenn Rodriguez MD [facility]        Today's Diagnoses     Rectocele    -  1       Follow-ups after your visit        Who to contact     If you have questions or need follow up information about today's clinic visit or your schedule please contact [facility] directly at [phone].  Normal or non-critical lab and imaging results will be communicated to you by MyChart, letter or phone within 4 business days after the clinic has received the results. If you do not hear from us within 7 days, please contact the clinic through MyChart or phone. If you have a critical or abnormal lab result, we will notify you by phone as soon as possible.  Submit refill requests through [employer] or call your pharmacy and they will forward the refill request to us. Please allow 3 business days for your refill to be completed.          Additional Information About Your Visit        Care EveryWhere ID     This is your Care EveryWhere ID. This could be used by other organizations to access your [place] medical records  [id number]        Your Vitals Were     Height BMI (Body Mass Index)                5' 3.25\" (1.607 m) 33.92 kg/m2           Blood Pressure from Last 3 Encounters:   08/02/18 122/80   07/05/18 122/76   03/27/18 126/70    Weight from Last 3 Encounters:   08/02/18 193 lb (87.5 kg)   07/05/18 192 lb (87.1 kg)   03/27/18 192 lb (87.1 kg)              Today, you had the following     No orders found for display       Primary Care Provider Office Phone # Fax #    Kenyatta Courtney [phone]       [address] [address]        Equal Access to Services     MANDI GARCIA AH: farrukh Bo " maxluca villanueva waxay idiin hayaan adeeg kharash la'aan ah. So [facility] [phone].    ATENCIÓN: Si juliet harris, tiene a martinez disposición servicios gratuitos de asistencia lingüística. Dylan al [phone].    We comply with applicable federal civil rights laws and [place] laws. We do not discriminate on the basis of race, color, national origin, age, disability, sex, sexual orientation, or gender identity.            Thank you!     Thank you for choosing [facility] [place]  for your care. Our goal is always to provide you with excellent care. Hearing back from our patients is one way we can continue to improve our services. Please take a few minutes to complete the written survey that you may receive in the mail after your visit with us. Thank you!             Your Updated Medication List - Protect others around you: Learn how to safely use, store and throw away your medicines at www.disposemymeds.org.          This list is accurate as of 8/2/18 11:59 PM.  Always use your most recent med list.                   Brand Name Dispense Instructions for use Diagnosis    BIOTIN PO      Take by mouth daily        brimonidine 0.15 % ophthalmic solution    ALPHAGAN-P     PLACE 1 DROP INTO RIGHT EYE 3 TIMES DAILY.        dorzolamide-timolol 2-0.5 % ophthalmic solution    COSOPT     1 drop        estradiol 0.1 MG/GM cream    ESTRACE VAGINAL    42.5 g    Place 1 g vaginally At Bedtime    Vaginal atrophy       latanoprost 0.005 % ophthalmic solution    XALATAN     1 drop        lisinopril 10 MG tablet    PRINIVIL/ZESTRIL     Take 10 mg by mouth daily.        SYNTHROID 100 MCG tablet   Generic drug:  levothyroxine      Take  by mouth daily.        triamcinolone 0.1 % cream    KENALOG     Apply  topically 2 times daily.        VITAMIN D (CHOLECALCIFEROL) PO      Take 2,000 Units by mouth daily

## 2019-04-01 NOTE — PROGRESS NOTES
Elo is a 79 year old No obstetric history on file. female who presents for follow-up of ovarian cancer    Do you have a Health Care Directive?: Yes: Patient states has Advance Directive and will bring in a copy to clinic.    Fall risk:   Fallen 2 or more times in the past year?: No  Any fall with injury in the past year?: No    HPI : The patient is a 79-year-old who is followed at this time for ovarian cancer that was treated surgically and with chemotherapy 22 years ago.  She has no evidence of recurrence on any of her exams to date.  She only complains of ongoing issues with a rectocele.  She is able to have bowel movements as long as she does not become constipated.      GYNECOLOGIC HISTORY:  No LMP recorded. Patient has had a hysterectomy..   reports that  has never smoked. she has never used smokeless tobacco.    STD testing offered?  Declined  Last PHQ-9 score on record=   PHQ-9 SCORE 4/3/2019   PHQ-9 Total Score 0     Last GAD7 score on record=   ROBERT-7 SCORE 3/27/2018 4/3/2019   Total Score 0 0       HEALTH MAINTENANCE:  Cholesterol: 3/21/2018   Total= 225, Triglycerides=174, HDL=43, JRQ=523   Last Mammo: 3/27/2018, Result: normal, Next Mammo: today   Pap: 3/27/2018 NIL, HPV-  Lab Results   Component Value Date    PAP NIL 03/27/2018    PAP NIL 03/13/2017    PAP NIL 02/08/2016      DEXA:  2/08/2016  Colonoscopy:  3/12/2018, Result:  normal, Next Colonoscopy: PRN.    HISTORY:  Obstetric History     No data available        Past Medical History:   Diagnosis Date     Hernia, abdominal     ventral     Hyperlipidemia      Hypertension      Ovarian cancer (H)      Thyroid disease      Past Surgical History:   Procedure Laterality Date     ABLATE VEIN VARICOSE RADIO FREQUENCY WITHOUT PHLEBECTOMY MULTIPLE STAB  12/5/2013    Procedure: ABLATE VEIN VARICOSE RADIO FREQUENCY WITHOUT PHLEBECTOMY MULTIPLE STAB;  Right Leg Radiofrequency Ablation;  Surgeon: Chuckie Neville MD;  Location: [facility]     HERNIA REPAIR        HYSTERECTOMY TOTAL ABDOMINAL, BILATERAL SALPINGO-OOPHORECTOMY, COMBINED      omenectomy     History reviewed. No pertinent family history.  Social History     Socioeconomic History     Marital status:      Spouse name: Not on file     Number of children: Not on file     Years of education: Not on file     Highest education level: Not on file   Occupational History     Not on file   Social Needs     Financial resource strain: Not on file     Food insecurity:     Worry: Not on file     Inability: Not on file     Transportation needs:     Medical: Not on file     Non-medical: Not on file   Tobacco Use     Smoking status: Never Smoker     Smokeless tobacco: Never Used   Substance and Sexual Activity     Alcohol use: No     Alcohol/week: 0.0 oz     Drug use: No     Sexual activity: Yes     Partners: Male     Birth control/protection: Post-menopausal, Female Surgical     Comment: MEGAN-no cervix   Lifestyle     Physical activity:     Days per week: Not on file     Minutes per session: Not on file     Stress: Not on file   Relationships     Social connections:     Talks on phone: Not on file     Gets together: Not on file     Attends Yazdanism service: Not on file     Active member of club or organization: Not on file     Attends meetings of clubs or organizations: Not on file     Relationship status: Not on file     Intimate partner violence:     Fear of current or ex partner: Not on file     Emotionally abused: Not on file     Physically abused: Not on file     Forced sexual activity: Not on file   Other Topics Concern     Parent/sibling w/ CABG, MI or angioplasty before 65F 55M? Not Asked   Social History Narrative     Not on file     Current Outpatient Medications   Medication Sig     BIOTIN PO Take by mouth daily     brimonidine (ALPHAGAN-P) 0.15 % ophthalmic solution PLACE 1 DROP INTO RIGHT EYE 3 TIMES DAILY.     dorzolamide-timolol (COSOPT) 2-0.5 % ophthalmic solution 1 drop     estradiol (ESTRACE  "VAGINAL) 0.1 MG/GM cream Place 1 g vaginally At Bedtime     latanoprost (XALATAN) 0.005 % ophthalmic solution 1 drop     levothyroxine (SYNTHROID) 100 MCG tablet Take  by mouth daily.     lisinopril (PRINIVIL,ZESTRIL) 10 MG tablet Take 10 mg by mouth daily.     triamcinolone (KENALOG) 0.1 % cream Apply  topically 2 times daily.     VITAMIN D, CHOLECALCIFEROL, PO Take 2,000 Units by mouth daily     No current facility-administered medications for this visit.      Allergies   Allergen Reactions     Alphagan [Brimonidine Tartrate]      Augmentin      Cefuroxime Hives     Hmg-Coa-R Inhibitors Other (See Comments)     Terbinafine Other (See Comments)     Loss of taste      Timolol Unknown     Amoxicillin Rash     Brimonidine      Other reaction(s): Intolerance-Can't Take     Hydrochlorothiazide Rash     No Clinical Screening - See Comments Rash     eye swollwn around glue - eye drop for glaucoma     Triple Antibiotic Rash       Past medical, surgical, social and family history were reviewed and updated in [facility].    EXAM:  /74   Pulse 78   Ht 1.626 m (5' 4\")   Wt 91.2 kg (201 lb)   Breastfeeding? No   BMI 34.50 kg/m     BMI: Body mass index is 34.5 kg/m .    Constitutional: Appearance: Well nourished, well developed alert, in no acute distress  Breasts: Inspection of Breasts:  No lymphadenopathy present    Palpation of Breasts and Axillae:  No masses present on palpation, no  breast tenderness    Axillary Lymph Nodes:  No lymphadenopathy present  Neurologic/Psychiatric:    Mental Status:  Oriented X3     Pelvic Exam:  External Genitalia:     Normal appearance for age, no discharge present, no tenderness present, no inflammatory lesions present, color normal  Vagina:     Normal vaginal vault without central or paravaginal defects, no discharge present, no inflammatory lesions present, no masses present  Bladder:     Nontender to palpation  Urethra:   Urethral Body:  Urethra palpation normal, urethra structural " support normal   Urethral Meatus:  No erythema or lesions present  Cervix:     Surgically absent  Uterus:     Surgically absent  Adnexa:     Surgically absent  Perineum:     Perineum within normal limits, no evidence of trauma, no rashes or skin lesions present  Anus:     Anus within normal limits, no hemorrhoids present  Inguinal Lymph Nodes:     No lymphadenopathy present    Body mass index is 34.5 kg/m .  Weight management plan noted, stable and monitoring   reports that  has never smoked. she has never used smokeless tobacco.      ASSESSMENT:  79 year old female with satisfactory annual exam.    ICD-10-CM    1. Encounter for gynecological examination without abnormal finding Z01.419 Pap imaged thin layer screen with HPV - recommended age 30 - 65     HPV High Risk Types DNA Cervical     Medicare Limited Visit   2. Personal history of ovarian cancer Z85.43    3. Vaginal atrophy N95.2        COUNSELING:   Reviewed preventive health counseling, as reflected in patient instructions       Regular exercise       Healthy diet/nutrition    PLAN/PATIENT INSTRUCTIONS: We will continue to follow the patient on a yearly basis for her ovarian cancer.  We have discussed surgical correction of her rectocele but the patient is very fearful that it will interfere with sexual function.  She is functional at this point in time and will defer until she has further issues.  We will contact her with her Ca1 25.      Glenn Rodriguez MD

## 2019-04-03 ENCOUNTER — OFFICE VISIT (OUTPATIENT)
Dept: OBGYN | Facility: CLINIC | Age: 79
End: 2019-04-03
Payer: COMMERCIAL

## 2019-04-03 VITALS
DIASTOLIC BLOOD PRESSURE: 74 MMHG | HEART RATE: 78 BPM | HEIGHT: 64 IN | SYSTOLIC BLOOD PRESSURE: 122 MMHG | BODY MASS INDEX: 34.31 KG/M2 | WEIGHT: 201 LBS

## 2019-04-03 DIAGNOSIS — Z85.43 PERSONAL HISTORY OF OVARIAN CANCER: Primary | ICD-10-CM

## 2019-04-03 DIAGNOSIS — N81.6 RECTOCELE: ICD-10-CM

## 2019-04-03 DIAGNOSIS — N95.2 VAGINAL ATROPHY: ICD-10-CM

## 2019-04-03 LAB — CANCER AG125 SERPL-ACNC: 19 U/ML (ref 0–30)

## 2019-04-03 PROCEDURE — G0123 SCREEN CERV/VAG THIN LAYER: HCPCS | Performed by: OBSTETRICS & GYNECOLOGY

## 2019-04-03 PROCEDURE — 86304 IMMUNOASSAY TUMOR CA 125: CPT | Performed by: OBSTETRICS & GYNECOLOGY

## 2019-04-03 PROCEDURE — G0476 HPV COMBO ASSAY CA SCREEN: HCPCS | Performed by: OBSTETRICS & GYNECOLOGY

## 2019-04-03 PROCEDURE — 36415 COLL VENOUS BLD VENIPUNCTURE: CPT | Performed by: OBSTETRICS & GYNECOLOGY

## 2019-04-03 PROCEDURE — 99213 OFFICE O/P EST LOW 20 MIN: CPT | Performed by: OBSTETRICS & GYNECOLOGY

## 2019-04-03 RX ORDER — ESTRADIOL 0.1 MG/G
1 CREAM VAGINAL AT BEDTIME
Qty: 42.5 G | Refills: 6 | Status: SHIPPED | OUTPATIENT
Start: 2019-04-03 | End: 2020-10-16

## 2019-04-03 ASSESSMENT — ANXIETY QUESTIONNAIRES
3. WORRYING TOO MUCH ABOUT DIFFERENT THINGS: NOT AT ALL
IF YOU CHECKED OFF ANY PROBLEMS ON THIS QUESTIONNAIRE, HOW DIFFICULT HAVE THESE PROBLEMS MADE IT FOR YOU TO DO YOUR WORK, TAKE CARE OF THINGS AT HOME, OR GET ALONG WITH OTHER PEOPLE: NOT DIFFICULT AT ALL
GAD7 TOTAL SCORE: 0
1. FEELING NERVOUS, ANXIOUS, OR ON EDGE: NOT AT ALL
2. NOT BEING ABLE TO STOP OR CONTROL WORRYING: NOT AT ALL
7. FEELING AFRAID AS IF SOMETHING AWFUL MIGHT HAPPEN: NOT AT ALL
5. BEING SO RESTLESS THAT IT IS HARD TO SIT STILL: NOT AT ALL
6. BECOMING EASILY ANNOYED OR IRRITABLE: NOT AT ALL

## 2019-04-03 ASSESSMENT — PATIENT HEALTH QUESTIONNAIRE - PHQ9
SUM OF ALL RESPONSES TO PHQ QUESTIONS 1-9: 0
5. POOR APPETITE OR OVEREATING: NOT AT ALL

## 2019-04-03 ASSESSMENT — MIFFLIN-ST. JEOR: SCORE: 1366.73

## 2019-04-03 NOTE — LETTER
April 10, 2019    Elo Issa  [address] [facility] [id number]    Dear ,  This letter is regarding your recent pap smear and Human Papillomavirus (HPV) test.  We are happy to inform you that your Pap smear result is normal. Cervical cancer is closely linked with certain types of HPV. Your results showed no evidence of high-risk HPV.  Please return to the clinic every year for an annual exam, pap smear, and other preventive tests.  If you have additional questions regarding this result, please call our registered nurse, Vijaya at [phone].  Sincerely,    Glenn Rodriguez MD  lks

## 2019-04-04 ASSESSMENT — ANXIETY QUESTIONNAIRES: GAD7 TOTAL SCORE: 0

## 2019-04-08 LAB
COPATH REPORT: NORMAL
PAP: NORMAL

## 2020-07-17 NOTE — PROGRESS NOTES
Elo is a 80 year old No obstetric history on file. female who presents for annual exam.     Besides routine health maintenance,  she would like to discuss prolapse.    Do you have a Health Care Directive?: Yes, advance care planning is on file.    Fall risk:   Fall Risk Assessment completed per order.    HPI: The patient is seen at this time for long-term follow-up of ovarian cancer that was treated surgically and postoperative chemotherapy 23 years ago.  Her only complaints at this time are progressive worsening of her vaginal prolapse.  She has incomplete emptying of bowel movements and some urgency incontinence.  The patient's PCP is Kenyatta Courtney MD, MD.      GYNECOLOGIC HISTORY:  No LMP recorded. Patient has had a hysterectomy..   reports that she has never smoked. She has never used smokeless tobacco.    Patient is not sexually active.  STD testing offered?  Declined  Last PHQ-9 score on record=   PHQ-9 SCORE 4/3/2019   PHQ-9 Total Score 0     Last GAD7 score on record=   ROBERT-7 SCORE 3/27/2018 4/3/2019   Total Score 0 0     Alcohol Score = 0    HEALTH MAINTENANCE:  Cholesterol: (No results found for: CHOL   Last Mammo: 3/27/2018, Result: Normal, Next Mammo: Today   Pap:   Lab Results   Component Value Date    PAP NIL 04/03/2019    PAP NIL 03/27/2018    PAP NIL 03/13/2017    4/3/19 WNL HPV (-)neg  DEXA:  2/8/16  Colonoscopy:  3/12/2018, Result:  Normal, Next Colonoscopy: no further.    Health maintenance updated:  yes    HISTORY:  OB History   No obstetric history on file.     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 PHLEBECTOMY MULTIPLE STAB  12/5/2013    Procedure: ABLATE VEIN VARICOSE RADIO FREQUENCY WITHOUT PHLEBECTOMY MULTIPLE STAB;  Right Leg Radiofrequency Ablation;  Surgeon: Chuckie Neville MD;  Location: [facility]     HERNIA REPAIR        "HYSTERECTOMY TOTAL ABDOMINAL, BILATERAL SALPINGO-OOPHORECTOMY, COMBINED      omenectomy      Social History     Tobacco Use     Smoking status: Never Smoker     Smokeless tobacco: Never Used   Substance Use Topics     Alcohol use: No     Alcohol/week: 0.0 standard drinks           Current Outpatient Medications   Medication Sig     BIOTIN PO Take by mouth daily     brimonidine (ALPHAGAN-P) 0.15 % ophthalmic solution PLACE 1 DROP INTO RIGHT EYE 3 TIMES DAILY.     dorzolamide-timolol (COSOPT) 2-0.5 % ophthalmic solution 1 drop     estradiol (ESTRACE VAGINAL) 0.1 MG/GM vaginal cream Place 1 g vaginally At Bedtime     latanoprost (XALATAN) 0.005 % ophthalmic solution 1 drop     levothyroxine (SYNTHROID) 100 MCG tablet Take  by mouth daily.     lisinopril (PRINIVIL,ZESTRIL) 10 MG tablet Take 10 mg by mouth daily.     triamcinolone (KENALOG) 0.1 % cream Apply  topically 2 times daily.     VITAMIN D, CHOLECALCIFEROL, PO Take 2,000 Units by mouth daily     No current facility-administered medications for this visit.        Allergies   Allergen Reactions     Alphagan [Brimonidine Tartrate]      Augmentin      Cefuroxime Hives     Hmg-Coa-R Inhibitors Other (See Comments)     Terbinafine Other (See Comments)     Loss of taste      Timolol Unknown     Amoxicillin Rash     Brimonidine      Other reaction(s): Intolerance-Can't Take     Hydrochlorothiazide Rash     No Clinical Screening - See Comments Rash     eye swollwn around glue - eye drop for glaucoma     Triple Antibiotic Rash       Past medical, surgical, social and family history were reviewed and updated in [facility].    ROS:   12 point review of systems negative other than symptoms noted below or in the HPI.  No urinary frequency or dysuria, bladder or kidney problems    EXAM:  /72   Pulse 66   Ht 1.6 m (5' 3\")   Wt 90.7 kg (200 lb)   Breastfeeding No   BMI 35.43 kg/m     BMI: Body mass index is 35.43 kg/m .    EXAM:  Constitutional: Appearance: Well nourished, " well developed alert, in no acute distress  Neck:  Lymph Nodes:  No lymphadenopathy present    Thyroid:  Gland size normal, nontender, no nodules or masses present  on palpation  Chest:  Respiratory Effort:  Breathing unlabored  Cardiovascular:Heart    Auscultation:  Regular rate, normal rhythm, no murmurs present  Breasts: Inspection of Breasts:  No lymphadenopathy present., Palpation of Breasts and Axillae:  No masses present on palpation, no breast tenderness., Axillary Lymph Nodes:  No lymphadenopathy present. and No nodularity, asymmetry or nipple discharge bilaterally.  Gastrointestinal:  Abdominal Examination:  Abdomen nontender to palpation, tone normal without     rigidity or guarding, no masses present, umbilicus without lesions    Liver and speen:  No hepatomegaly present, liver nontender to palpation    Hernias:  No hernias present  Lymphatic: Lymph Nodes:  No other lymphadenopathy present  Skin:  General Inspection:  No rashes present, no lesions present, no areas of  discoloration.    Genitalia and Groin:  No rashes present, no lesions present, no areas of  discoloration, no masses present  Neurologic/Psychiatric:    Mental Status:  Oriented X3     Pelvic Exam:  External Genitalia:     Normal appearance for age, no discharge present, no tenderness present, no inflammatory lesions present, color normal  Vagina:     Normal vaginal vault without central or paravaginal defects, no discharge present, no inflammatory lesions present, no masses present  Bladder:     Nontender to palpation  Urethra:   Urethral Body:  Urethra palpation normal, urethra structural support normal   Urethral Meatus:  No erythema or lesions present  Cervix:     Surgically absent  Uterus:     Surgically absent  Adnexa:     Surgically absent  Perineum:     Perineum within normal limits, no evidence of trauma, no rashes or skin lesions present  Anus:     Anus within normal limits, no hemorrhoids present  Inguinal Lymph Nodes:     No  lymphadenopathy present    COUNSELING:   Reviewed preventive health counseling, as reflected in patient instructions       Regular exercise       Healthy diet/nutrition    BMI:  Body mass index is 35.43 kg/m .  Weight management plan: Discussed healthy diet and exercise guidelines   reports that she has never smoked. She has never used smokeless tobacco.      ASSESSMENT:  80 year old female with satisfactory annual exam.    ICD-10-CM    1. Encounter for gynecological examination without abnormal finding  Z01.419 Pap imaged thin layer screen with HPV - recommended age 30 - 65     HPV High Risk Types DNA Cervical       PLAN: We will convey the patient's lab and Pap smear.  She will follow-up in 3 months for cleaning of a #3 flat pessary.      Glenn Rodriguez MD

## 2020-07-20 ENCOUNTER — OFFICE VISIT (OUTPATIENT)
Dept: OBGYN | Facility: CLINIC | Age: 80
End: 2020-07-20
Attending: OBSTETRICS & GYNECOLOGY
Payer: COMMERCIAL

## 2020-07-20 ENCOUNTER — ANCILLARY PROCEDURE (OUTPATIENT)
Dept: MAMMOGRAPHY | Facility: CLINIC | Age: 80
End: 2020-07-20
Attending: OBSTETRICS & GYNECOLOGY
Payer: COMMERCIAL

## 2020-07-20 VITALS
BODY MASS INDEX: 35.44 KG/M2 | SYSTOLIC BLOOD PRESSURE: 128 MMHG | WEIGHT: 200 LBS | DIASTOLIC BLOOD PRESSURE: 72 MMHG | HEIGHT: 63 IN | HEART RATE: 66 BPM

## 2020-07-20 DIAGNOSIS — Z85.43 PERSONAL HISTORY OF OVARIAN CANCER: ICD-10-CM

## 2020-07-20 DIAGNOSIS — Z12.31 VISIT FOR SCREENING MAMMOGRAM: ICD-10-CM

## 2020-07-20 DIAGNOSIS — Z01.419 ENCOUNTER FOR GYNECOLOGICAL EXAMINATION WITHOUT ABNORMAL FINDING: Primary | ICD-10-CM

## 2020-07-20 PROCEDURE — 99397 PER PM REEVAL EST PAT 65+ YR: CPT | Performed by: OBSTETRICS & GYNECOLOGY

## 2020-07-20 PROCEDURE — G0145 SCR C/V CYTO,THINLAYER,RESCR: HCPCS | Performed by: OBSTETRICS & GYNECOLOGY

## 2020-07-20 PROCEDURE — G0476 HPV COMBO ASSAY CA SCREEN: HCPCS | Performed by: OBSTETRICS & GYNECOLOGY

## 2020-07-20 PROCEDURE — 77067 SCR MAMMO BI INCL CAD: CPT | Mod: TC

## 2020-07-20 ASSESSMENT — MIFFLIN-ST. JEOR: SCORE: 1341.32

## 2020-07-20 NOTE — LETTER
July 29, 2020    Elo Issa  [address] [facility] [id number]    Dear ,  This letter is regarding your recent Pap smear (cervical cancer screening) and Human Papillomavirus (HPV) test.  We are happy to inform you that your Pap smear result is normal. Cervical cancer is closely linked with certain types of HPV. Your results showed no evidence of high-risk HPV.  We recommend you have your next PAP smear and HPV test in 1 year.  You will still need to return to the clinic every year for an annual exam and other preventive tests.  If you have additional questions regarding this result, please call our registered nurse, Vijaya at [phone].  Sincerely,    Glenn Rodriguez MD //[facility]

## 2020-07-20 NOTE — NURSING NOTE
Elo forgot to stop at lab to get her  drawn. Per Jennifer okay to have drawn in 3 months for her pessary follow up.

## 2020-07-22 LAB
COPATH REPORT: NORMAL
PAP: NORMAL

## 2020-09-29 ENCOUNTER — MYC MEDICAL ADVICE (OUTPATIENT)
Dept: OBGYN | Facility: CLINIC | Age: 80
End: 2020-09-29

## 2020-09-29 NOTE — TELEPHONE ENCOUNTER
Routing pt [employer] message to provider to advise.    Bethany Calero RN on 9/29/2020 at 4:09 PM

## 2020-10-16 ENCOUNTER — MYC REFILL (OUTPATIENT)
Dept: OBGYN | Facility: CLINIC | Age: 80
End: 2020-10-16

## 2020-10-16 DIAGNOSIS — N95.2 VAGINAL ATROPHY: ICD-10-CM

## 2020-10-19 RX ORDER — ESTRADIOL 0.1 MG/G
1 CREAM VAGINAL AT BEDTIME
Qty: 42.5 G | Refills: 6 | Status: SHIPPED | OUTPATIENT
Start: 2020-10-19 | End: 2021-07-22

## 2020-10-19 NOTE — TELEPHONE ENCOUNTER
"Requested Prescriptions   Pending Prescriptions Disp Refills     estradiol (ESTRACE VAGINAL) 0.1 MG/GM vaginal cream 42.5 g 6     Sig: Place 1 g vaginally At Bedtime       Hormone Replacement Therapy Passed - 10/16/2020  7:43 PM        Passed - Blood pressure under 140/90 in past 12 months     BP Readings from Last 3 Encounters:   07/20/20 128/72   04/03/19 122/74   08/02/18 122/80                 Passed - Recent (12 mo) or future (30 days) visit within the authorizing provider's specialty     Patient has had an office visit with the authorizing provider or a provider within the authorizing providers department within the previous 12 mos or has a future within next 30 days. See \"Patient Info\" tab in inbasket, or \"Choose Columns\" in Meds & Orders section of the refill encounter.              Passed - Medication is active on med list        Passed - Patient is 18 years of age or older        Passed - No active pregnancy on record        Passed - No positive pregnancy test on record in past 12 months           Last Written Prescription Date:  4/3/19  Last Fill Quantity: 42.5g,  # refills: 6   Last office visit: 7/20/2020 with prescribing provider:  Dr Rodriguez   Future Office Visit:      Prescription approved per [facility] Refill Protocol.  Bethany Calero RN on 10/19/2020 at 9:15 AM          "

## 2021-01-15 ENCOUNTER — HEALTH MAINTENANCE LETTER (OUTPATIENT)
Age: 81
End: 2021-01-15

## 2021-07-22 ENCOUNTER — OFFICE VISIT (OUTPATIENT)
Dept: OBGYN | Facility: CLINIC | Age: 81
End: 2021-07-22
Payer: COMMERCIAL

## 2021-07-22 ENCOUNTER — ANCILLARY PROCEDURE (OUTPATIENT)
Dept: MAMMOGRAPHY | Facility: CLINIC | Age: 81
End: 2021-07-22
Payer: COMMERCIAL

## 2021-07-22 VITALS
WEIGHT: 204 LBS | DIASTOLIC BLOOD PRESSURE: 76 MMHG | HEART RATE: 72 BPM | HEIGHT: 63 IN | BODY MASS INDEX: 36.14 KG/M2 | SYSTOLIC BLOOD PRESSURE: 124 MMHG

## 2021-07-22 DIAGNOSIS — N95.2 VAGINAL ATROPHY: ICD-10-CM

## 2021-07-22 DIAGNOSIS — Z85.43 PERSONAL HISTORY OF OVARIAN CANCER: ICD-10-CM

## 2021-07-22 DIAGNOSIS — Z12.31 VISIT FOR SCREENING MAMMOGRAM: ICD-10-CM

## 2021-07-22 DIAGNOSIS — Z01.419 ENCOUNTER FOR GYNECOLOGICAL EXAMINATION WITHOUT ABNORMAL FINDING: Primary | ICD-10-CM

## 2021-07-22 PROCEDURE — 99397 PER PM REEVAL EST PAT 65+ YR: CPT | Performed by: OBSTETRICS & GYNECOLOGY

## 2021-07-22 PROCEDURE — 87624 HPV HI-RISK TYP POOLED RSLT: CPT | Performed by: OBSTETRICS & GYNECOLOGY

## 2021-07-22 PROCEDURE — 77067 SCR MAMMO BI INCL CAD: CPT | Mod: TC | Performed by: RADIOLOGY

## 2021-07-22 PROCEDURE — G0145 SCR C/V CYTO,THINLAYER,RESCR: HCPCS | Performed by: OBSTETRICS & GYNECOLOGY

## 2021-07-22 RX ORDER — HYDROXYCHLOROQUINE SULFATE 200 MG/1
200 TABLET, FILM COATED ORAL
COMMUNITY
Start: 2021-07-14

## 2021-07-22 RX ORDER — ESTRADIOL 0.1 MG/G
1 CREAM VAGINAL AT BEDTIME
Qty: 42.5 G | Refills: 6 | Status: SHIPPED | OUTPATIENT
Start: 2021-07-22

## 2021-07-22 ASSESSMENT — ANXIETY QUESTIONNAIRES
3. WORRYING TOO MUCH ABOUT DIFFERENT THINGS: NOT AT ALL
1. FEELING NERVOUS, ANXIOUS, OR ON EDGE: NOT AT ALL
6. BECOMING EASILY ANNOYED OR IRRITABLE: NOT AT ALL
GAD7 TOTAL SCORE: 0
5. BEING SO RESTLESS THAT IT IS HARD TO SIT STILL: NOT AT ALL
2. NOT BEING ABLE TO STOP OR CONTROL WORRYING: NOT AT ALL
7. FEELING AFRAID AS IF SOMETHING AWFUL MIGHT HAPPEN: NOT AT ALL

## 2021-07-22 ASSESSMENT — PATIENT HEALTH QUESTIONNAIRE - PHQ9
SUM OF ALL RESPONSES TO PHQ QUESTIONS 1-9: 0
5. POOR APPETITE OR OVEREATING: NOT AT ALL

## 2021-07-22 ASSESSMENT — MIFFLIN-ST. JEOR: SCORE: 1354.47

## 2021-07-22 NOTE — PROGRESS NOTES
Elo is a 81 year old No obstetric history on file. female who presents for annual exam.     Besides routine health maintenance, she has no other health concerns today .    Do you have a Health Care Directive?: Yes, patient states has an Advance Care Planning document and will bring a copy to the clinic.    Fall risk:   Fallen 2 or more times in the past year?: No  Any fall with injury in the past year?: No    HPI: The patient is doing well at this time.  She has had a hysterectomy and is now over 24 years out from treatment of ovarian cancer with both radical surgery and chemotherapy.  She has shown no sign of recurrence.  She recently has a new diagnosis of lupus that presented with arthritis in her hands and arms.  She is responding nicely to hydroxychloroquine.  The patient's PCP is Kenyatta Courtney MD, MD.      GYNECOLOGIC HISTORY:  No LMP recorded. Patient has had a hysterectomy..   reports that she has never smoked. She has never used smokeless tobacco.    Patient is not sexually active.  STD testing offered?  Declined  Last PHQ-9 score on record=   PHQ-9 SCORE 7/22/2021   PHQ-9 Total Score 0     Last GAD7 score on record=   ROBERT-7 SCORE 3/27/2018 4/3/2019 7/22/2021   Total Score 0 0 0     Alcohol Score = 0    HEALTH MAINTENANCE:  Cholesterol:done at PCP   Last Mammo: One year ago, Result: Normal, Next Mammo: Today   Pap:   Lab Results   Component Value Date    PAP NIL HPV- 07/20/2020    PAP NIL 04/03/2019    PAP NIL 03/27/2018     DEXA:  2016  Colonoscopy: 3/12/18, Result:  Normal, Next Colonoscopy: no further needed.    Health maintenance updated:  yes    HISTORY:  OB History   No obstetric history on file.     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 PHLEBECTOMY MULTIPLE STAB  12/5/2013    Procedure: ABLATE VEIN VARICOSE RADIO FREQUENCY WITHOUT PHLEBECTOMY  MULTIPLE STAB;  Right Leg Radiofrequency Ablation;  Surgeon: Chuckie Neville MD;  Location: [facility]     HERNIA REPAIR       HYSTERECTOMY TOTAL ABDOMINAL, BILATERAL SALPINGO-OOPHORECTOMY, COMBINED      omenectomy      Social History     Tobacco Use     Smoking status: Never Smoker     Smokeless tobacco: Never Used   Substance Use Topics     Alcohol use: No     Alcohol/week: 0.0 standard drinks      Problem (# of Occurrences) Relation (Name,Age of Onset)    No Known Problems (8) Mother, Father, Sister, Brother, Maternal Grandmother, Maternal Grandfather, Paternal Grandmother, Other            Current Outpatient Medications   Medication Sig     BIOTIN PO Take by mouth daily     brimonidine (ALPHAGAN-P) 0.15 % ophthalmic solution PLACE 1 DROP INTO RIGHT EYE 3 TIMES DAILY.     dorzolamide-timolol (COSOPT) 2-0.5 % ophthalmic solution 1 drop     estradiol (ESTRACE VAGINAL) 0.1 MG/GM vaginal cream Place 1 g vaginally At Bedtime     hydroxychloroquine (PLAQUENIL) 200 MG tablet Take 200 mg by mouth     latanoprost (XALATAN) 0.005 % ophthalmic solution 1 drop     levothyroxine (SYNTHROID) 100 MCG tablet Take  by mouth daily.     lisinopril (PRINIVIL,ZESTRIL) 10 MG tablet Take 10 mg by mouth daily.     triamcinolone (KENALOG) 0.1 % cream Apply  topically 2 times daily.     VITAMIN D, CHOLECALCIFEROL, PO Take 2,000 Units by mouth daily     No current facility-administered medications for this visit.       Allergies   Allergen Reactions     Alphagan [Brimonidine Tartrate]      Augmentin      Cefuroxime Hives     Hmg-Coa-R Inhibitors Other (See Comments)     Terbinafine Other (See Comments)     Loss of taste      Timolol Unknown     Amoxicillin Rash     Brimonidine      Other reaction(s): Intolerance-Can't Take     Hydrochlorothiazide Rash     No Clinical Screening - See Comments Rash     eye swollwn around glue - eye drop for glaucoma     Triple Antibiotic Rash       Past medical, surgical, social and family history were  "reviewed and updated in EPIC.    ROS:   12 point review of systems negative other than symptoms noted below or in the HPI.  No urinary frequency or dysuria, bladder or kidney problems    EXAM:  /76   Pulse 72   Ht 1.6 m (5' 3\")   Wt 92.5 kg (204 lb)   BMI 36.14 kg/m     BMI: Body mass index is 36.14 kg/m .    EXAM:  Constitutional: Appearance: Well nourished, well developed alert, in no acute distress  Neck:  Lymph Nodes:  No lymphadenopathy present    Thyroid:  Gland size normal, nontender, no nodules or masses present  on palpation  Chest:  Respiratory Effort:  Breathing unlabored  Cardiovascular:Heart    Auscultation:  Regular rate, normal rhythm, no murmurs present  Breasts: Inspection of Breasts:  No lymphadenopathy present., Palpation of Breasts and Axillae:  No masses present on palpation, no breast tenderness., Axillary Lymph Nodes:  No lymphadenopathy present. and No nodularity, asymmetry or nipple discharge bilaterally.  Gastrointestinal:  Abdominal Examination:  Abdomen nontender to palpation, tone normal without     rigidity or guarding, no masses present, umbilicus without lesions    Liver and speen:  No hepatomegaly present, liver nontender to palpation    Hernias:  No hernias present  Lymphatic: Lymph Nodes:  No other lymphadenopathy present  Skin:  General Inspection:  No rashes present, no lesions present, no areas of  discoloration.    Genitalia and Groin:  No rashes present, no lesions present, no areas of  discoloration, no masses present  Neurologic/Psychiatric:    Mental Status:  Oriented X3     Pelvic Exam:  External Genitalia:     Normal appearance for age, no discharge present, no tenderness present, no inflammatory lesions present, color normal  Vagina:     Normal vaginal vault without central or paravaginal defects, no discharge present, no inflammatory lesions present, no masses present  Bladder:     Nontender to palpation  Urethra:   Urethral Body:  Urethra palpation normal, " urethra structural support normal   Urethral Meatus:  No erythema or lesions present  Cervix:     Surgically absent  Uterus:     Surgically absent  Adnexa:     Surgically absent  Perineum:     Perineum within normal limits, no evidence of trauma, no rashes or skin lesions present  Anus:     Anus within normal limits, no hemorrhoids present  Inguinal Lymph Nodes:     No lymphadenopathy present    COUNSELING:   Reviewed preventive health counseling, as reflected in patient instructions       Regular exercise       Healthy diet/nutrition    BMI:  Body mass index is 36.14 kg/m .     reports that she has never smoked. She has never used smokeless tobacco.      ASSESSMENT:  81 year old female with satisfactory annual exam.    ICD-10-CM    1. Encounter for gynecological examination without abnormal finding  Z01.419 Pap imaged thin layer screen with HPV - recommended age 30 - 65 years (select HPV order below)   2. Personal history of ovarian cancer  Z85.43    3. Vaginal atrophy  N95.2 estradiol (ESTRACE VAGINAL) 0.1 MG/GM vaginal cream       PLAN: 81-year-old patient with no evidence of recurrence of her ovarian cancer.  She will be followed by her internist from here on out.  We will convey her final Pap smear and mammogram results today.  She is overdue for a colonoscopy as she has had some suspicious polyps in the past.      Glenn Rodriguez MD

## 2021-07-23 ASSESSMENT — ANXIETY QUESTIONNAIRES: GAD7 TOTAL SCORE: 0

## 2021-07-27 LAB
BKR LAB AP GYN ADEQUACY: NORMAL
BKR LAB AP GYN INTERPRETATION: NORMAL
BKR LAB AP HPV REFLEX: NORMAL
BKR LAB AP PREVIOUS ABNORMAL: NORMAL
PATH REPORT.COMMENTS IMP SPEC: NORMAL
PATH REPORT.RELEVANT HX SPEC: NORMAL

## 2021-07-29 LAB
HUMAN PAPILLOMA VIRUS 16 DNA: NEGATIVE
HUMAN PAPILLOMA VIRUS 18 DNA: NEGATIVE
HUMAN PAPILLOMA VIRUS FINAL DIAGNOSIS: NORMAL
HUMAN PAPILLOMA VIRUS OTHER HR: NEGATIVE

## 2021-09-04 ENCOUNTER — HEALTH MAINTENANCE LETTER (OUTPATIENT)
Age: 81
End: 2021-09-04

## 2022-10-16 ENCOUNTER — HEALTH MAINTENANCE LETTER (OUTPATIENT)
Age: 82
End: 2022-10-16

## 2023-11-04 ENCOUNTER — HEALTH MAINTENANCE LETTER (OUTPATIENT)
Age: 83
End: 2023-11-04

## 2024-01-31 ENCOUNTER — TELEPHONE (OUTPATIENT)
Dept: AUDIOLOGY | Facility: CLINIC | Age: 84
End: 2024-01-31
Payer: COMMERCIAL

## 2024-01-31 ENCOUNTER — TRANSCRIBE ORDERS (OUTPATIENT)
Dept: OTHER | Age: 84
End: 2024-01-31

## 2024-02-01 NOTE — TELEPHONE ENCOUNTER
Spoke to Alisson - she indicated that this referral for self is incorrect - that referral should be for her , Mao. Will handle that info in separate encounter in pt's chart.     -Claudia CONSTANTINO 2/1/24

## 2024-03-23 ENCOUNTER — HEALTH MAINTENANCE LETTER (OUTPATIENT)
Age: 84
End: 2024-03-23